# Patient Record
Sex: MALE | Race: AMERICAN INDIAN OR ALASKA NATIVE
[De-identification: names, ages, dates, MRNs, and addresses within clinical notes are randomized per-mention and may not be internally consistent; named-entity substitution may affect disease eponyms.]

---

## 2021-01-07 ENCOUNTER — HOSPITAL ENCOUNTER (EMERGENCY)
Dept: HOSPITAL 5 - ED | Age: 26
Discharge: HOME | End: 2021-01-07
Payer: SELF-PAY

## 2021-01-07 VITALS — DIASTOLIC BLOOD PRESSURE: 76 MMHG | SYSTOLIC BLOOD PRESSURE: 114 MMHG

## 2021-01-07 DIAGNOSIS — L30.9: ICD-10-CM

## 2021-01-07 DIAGNOSIS — L08.0: Primary | ICD-10-CM

## 2021-01-07 DIAGNOSIS — Z79.899: ICD-10-CM

## 2021-01-07 PROCEDURE — 99282 EMERGENCY DEPT VISIT SF MDM: CPT

## 2021-01-07 NOTE — EMERGENCY DEPARTMENT REPORT
ED General Adult HPI





- General


Chief complaint: Skin Rash


Stated complaint: FACIAL  BREAK OUT


Time Seen by Provider: 01/07/21 18:51


Source: patient


Mode of arrival: Ambulatory


Limitations: No Limitations





- History of Present Illness


Initial comments: 


25 year old male with pmhx of exzema presents to ED c/o facial rash. Patient 

states he started with a bumpy, painful rash with clear yellow drainage to face 

about 6 days ago. He states prior to the onset of the rash he was using 

triamcinolone cream to treat his facial eczema, then about 8 days ago he stopped

and started using natural products like aloe vera, black seed oil and shea 

butter and 2 days after the painful bumpy rash started to his face. He states he

has still been using the natural products, he last applied the black seed oil 

last night and also started apply neosporin. He states he has been washing his 

face with black soap which he stopped and has since just been using plain water.

He denies any swelling, fever, chills or difficulty breathing or any other 

symptoms at this time. 





MD Complaint: Facial Rash 


-: Gradual (6 days ago )


Location: face





- Related Data


                                  Previous Rx's











 Medication  Instructions  Recorded  Last Taken  Type


 


Ibuprofen [Motrin] 800 mg PO Q8HR PRN #30 tablet 01/07/21 Unknown Rx


 


Mupirocin [Bactroban 2%] 1 applic TP TID #1 tube 01/07/21 Unknown Rx


 


cephALEXin [Keflex] 500 mg PO Q6HR #40 capsule 01/07/21 Unknown Rx











                                    Allergies











Allergy/AdvReac Type Severity Reaction Status Date / Time


 


No Known Allergies Allergy   Unverified 01/07/21 18:45














ED Review of Systems


ROS: 


Stated complaint: FACIAL  BREAK OUT


Other details as noted in HPI





Comment: All other systems reviewed and negative


Constitutional: denies: chills, fever


Eyes: denies: eye pain, eye discharge, vision change


ENT: denies: ear pain, throat pain


Respiratory: denies: cough, shortness of breath, wheezing


Cardiovascular: denies: chest pain, palpitations


Skin: rash, lesions


Neurological: denies: headache, weakness, paresthesias


Psychiatric: denies: anxiety, depression





ED Past Medical Hx





- Past Medical History


Previous Medical History?: No





- Surgical History


Past Surgical History?: No





- Social History


Smoking Status: Never Smoker


Substance Use Type: None





- Medications


Home Medications: 


                                Home Medications











 Medication  Instructions  Recorded  Confirmed  Last Taken  Type


 


Ibuprofen [Motrin] 800 mg PO Q8HR PRN #30 tablet 01/07/21  Unknown Rx


 


Mupirocin [Bactroban 2%] 1 applic TP TID #1 tube 01/07/21  Unknown Rx


 


cephALEXin [Keflex] 500 mg PO Q6HR #40 capsule 01/07/21  Unknown Rx














ED Physical Exam





- General


Limitations: No Limitations


General appearance: alert, in no apparent distress





- Head


Head exam: Present: atraumatic, normocephalic, normal inspection





- Eye


Eye exam: Present: normal appearance, PERRL, EOMI


Pupils: Present: normal accommodation





- ENT


ENT exam: Present: normal exam, normal orophraynx, mucous membranes moist





- Neck


Neck exam: Present: full ROM





- Respiratory


Respiratory exam: Absent: normal lung sounds bilaterally





- Cardiovascular


Cardiovascular Exam: Present: regular rate





- Neurological Exam


Neurological exam: Present: alert, oriented X3, CN II-XII intact





- Psychiatric


Psychiatric exam: Present: normal affect, normal mood





- Skin


Skin exam: Present: other (underlying eczematous rash noted to face but there is

 an associated papular/pustular yellow custed rash scattered over the face with 

mild swelling to forehead and cheeks. No apparent cellulitis, induration or 

fluctuance noted. )





ED Course


                                   Vital Signs











  01/07/21





  18:47


 


Temperature 98.2 F


 


Pulse Rate 73


 


Respiratory 18





Rate 


 


Blood Pressure 114/76


 


O2 Sat by Pulse 100





Oximetry 











Critical care attestation.: 


If time is entered above; I have spent that time in minutes in the direct care 

of this critically ill patient, excluding procedure time.








ED Disposition


Clinical Impression: 


 Pustular rash, Hx of eczema





Disposition: DC-01 TO HOME OR SELFCARE


Is pt being admited?: No


Does the pt Need Aspirin: No


Condition: Stable


Instructions:  Atopic Dermatitis, Impetigo, Adult


Additional Instructions: 


Stop the black seed oil, aloe vera and the shea butter. Wash your face with 

fragrance free moisturizing dove soap after which you will apply the ointibiotic

 ointment to face as directed. Take the oral antibiotic to completion. Follow up

 with Dermatologist Listed on your d/c instructions. Return to ED if symptoms 

changes or worsens. 


Dermatologist: 


[facility]


[address], [place] [address]


[phone]





Prescriptions: 


Mupirocin [Bactroban 2%] 1 applic TP TID #1 tube


cephALEXin [Keflex] 500 mg PO Q6HR #40 capsule


Ibuprofen [Motrin] 800 mg PO Q8HR PRN #30 tablet


 PRN Reason: Pain , Severe (7-10)


Forms:  Work/School Release Form(ED)


Time of Disposition: 19:19

## 2022-07-20 ENCOUNTER — HOSPITAL ENCOUNTER (EMERGENCY)
Dept: HOSPITAL 5 - ED | Age: 27
Discharge: LEFT BEFORE DISCHARGE | End: 2022-07-20
Payer: SELF-PAY

## 2022-07-20 DIAGNOSIS — R10.9: Primary | ICD-10-CM

## 2022-07-20 DIAGNOSIS — Z53.21: ICD-10-CM
